# Patient Record
Sex: MALE | URBAN - METROPOLITAN AREA
[De-identification: names, ages, dates, MRNs, and addresses within clinical notes are randomized per-mention and may not be internally consistent; named-entity substitution may affect disease eponyms.]

---

## 2024-05-09 ENCOUNTER — APPOINTMENT (RX ONLY)
Dept: URBAN - METROPOLITAN AREA CLINIC 86 | Facility: CLINIC | Age: 29
Setting detail: DERMATOLOGY
End: 2024-05-09

## 2024-05-09 VITALS — HEIGHT: 70 IN | WEIGHT: 135 LBS

## 2024-05-09 DIAGNOSIS — A63.0 ANOGENITAL (VENEREAL) WARTS: ICD-10-CM | Status: INADEQUATELY CONTROLLED

## 2024-05-09 PROCEDURE — 99202 OFFICE O/P NEW SF 15 MIN: CPT | Mod: 25

## 2024-05-09 PROCEDURE — 17110 DESTRUCTION B9 LES UP TO 14: CPT

## 2024-05-09 PROCEDURE — ? LIQUID NITROGEN

## 2024-05-09 PROCEDURE — ? SEPARATE AND IDENTIFIABLE DOCUMENTATION

## 2024-05-09 PROCEDURE — ? COUNSELING

## 2024-05-09 ASSESSMENT — LOCATION ZONE DERM
LOCATION ZONE: PENIS
LOCATION ZONE: TRUNK
LOCATION ZONE: GENITALIA

## 2024-05-09 ASSESSMENT — LOCATION SIMPLE DESCRIPTION DERM
LOCATION SIMPLE: PENIS
LOCATION SIMPLE: SCROTUM
LOCATION SIMPLE: ABDOMEN

## 2024-05-09 ASSESSMENT — TOTAL NUMBER OF CONDYLOMA: # OF LESIONS?: 9

## 2024-05-09 ASSESSMENT — LOCATION DETAILED DESCRIPTION DERM
LOCATION DETAILED: LEFT DORSAL SHAFT OF PENIS
LOCATION DETAILED: BASE OF THE PENIS
LOCATION DETAILED: RIGHT DORSAL SHAFT OF PENIS
LOCATION DETAILED: LEFT ANTERIOR SCROTUM
LOCATION DETAILED: PERIUMBILICAL SKIN

## 2024-05-09 NOTE — PROCEDURE: LIQUID NITROGEN
Spray Paint Text: The liquid nitrogen was applied to the skin utilizing a spray paint frosting technique.
Add 52 Modifier (Optional): no
Medical Necessity Information: It is in your best interest to select a reason for this procedure from the list below. All of these items fulfill various CMS LCD requirements except the new and changing color options.
Consent: The patient's consent was obtained including but not limited to risks of crusting, scabbing, blistering, scarring, darker or lighter pigmentary change, recurrence, incomplete removal and infection.
Show Topical Anesthesia Variable?: Yes
Application Tool (Optional): Liquid Nitrogen Sprayer
Number Of Freeze-Thaw Cycles: 2 freeze-thaw cycles
Medical Necessity Clause: This procedure was medically necessary because the lesions that were treated were:
Detail Level: Detailed
Duration Of Freeze Thaw-Cycle (Seconds): 5-10
Post-Care Instructions: I reviewed with the patient in detail post-care instructions. Patient is to wear sunprotection, and avoid picking at any of the treated lesions. Pt may apply Vaseline to crusted or scabbing areas.

## 2024-06-12 ENCOUNTER — APPOINTMENT (RX ONLY)
Dept: URBAN - METROPOLITAN AREA CLINIC 86 | Facility: CLINIC | Age: 29
Setting detail: DERMATOLOGY
End: 2024-06-12

## 2024-06-12 VITALS — HEIGHT: 72 IN | WEIGHT: 140 LBS

## 2024-06-12 DIAGNOSIS — A63.0 ANOGENITAL (VENEREAL) WARTS: ICD-10-CM | Status: IMPROVED

## 2024-06-12 PROCEDURE — ? COUNSELING

## 2024-06-12 PROCEDURE — ? SEPARATE AND IDENTIFIABLE DOCUMENTATION

## 2024-06-12 PROCEDURE — 17110 DESTRUCTION B9 LES UP TO 14: CPT

## 2024-06-12 PROCEDURE — ? LIQUID NITROGEN

## 2024-06-12 PROCEDURE — 99212 OFFICE O/P EST SF 10 MIN: CPT | Mod: 25

## 2024-06-12 ASSESSMENT — LOCATION DETAILED DESCRIPTION DERM
LOCATION DETAILED: PERIUMBILICAL SKIN
LOCATION DETAILED: LEFT LATERAL DISTAL DORSAL PENILE SHAFT
LOCATION DETAILED: RIGHT LATERAL DISTAL DORSAL PENILE SHAFT
LOCATION DETAILED: RIGHT PENIS BASE
LOCATION DETAILED: DISTAL DORSAL PENILE SHAFT

## 2024-06-12 ASSESSMENT — LOCATION SIMPLE DESCRIPTION DERM
LOCATION SIMPLE: RIGHT PENIS
LOCATION SIMPLE: LEFT PENIS
LOCATION SIMPLE: DORSAL PENIS
LOCATION SIMPLE: ABDOMEN

## 2024-06-12 ASSESSMENT — LOCATION ZONE DERM
LOCATION ZONE: TRUNK
LOCATION ZONE: PENIS